# Patient Record
Sex: FEMALE | Race: BLACK OR AFRICAN AMERICAN | URBAN - METROPOLITAN AREA
[De-identification: names, ages, dates, MRNs, and addresses within clinical notes are randomized per-mention and may not be internally consistent; named-entity substitution may affect disease eponyms.]

---

## 2019-11-12 ENCOUNTER — HOSPITAL ENCOUNTER (OUTPATIENT)
Facility: CLINIC | Age: 64
End: 2019-11-12
Attending: UROLOGY | Admitting: UROLOGY
Payer: COMMERCIAL

## 2019-11-15 RX ORDER — CEFAZOLIN SODIUM 2 G/100ML
2 INJECTION, SOLUTION INTRAVENOUS
Status: CANCELLED | OUTPATIENT
Start: 2019-11-21

## 2022-10-16 ENCOUNTER — LAB REQUISITION (OUTPATIENT)
Dept: LAB | Facility: CLINIC | Age: 67
End: 2022-10-16

## 2022-10-16 VITALS
RESPIRATION RATE: 18 BRPM | WEIGHT: 153.6 LBS | DIASTOLIC BLOOD PRESSURE: 75 MMHG | OXYGEN SATURATION: 100 % | HEART RATE: 69 BPM | TEMPERATURE: 97.6 F | SYSTOLIC BLOOD PRESSURE: 148 MMHG

## 2022-10-16 DIAGNOSIS — Z00.01 ENCOUNTER FOR GENERAL ADULT MEDICAL EXAMINATION WITH ABNORMAL FINDINGS: ICD-10-CM

## 2022-10-16 PROBLEM — M79.604 CHRONIC PAIN OF RIGHT LOWER EXTREMITY: Status: ACTIVE | Noted: 2019-11-15

## 2022-10-16 PROBLEM — C67.9 BLADDER CANCER (H): Status: ACTIVE | Noted: 2019-11-15

## 2022-10-16 PROBLEM — F41.8 DEPRESSION WITH ANXIETY: Status: ACTIVE | Noted: 2019-11-15

## 2022-10-16 PROBLEM — H40.9 GLAUCOMA: Status: ACTIVE | Noted: 2019-12-11

## 2022-10-16 PROBLEM — M79.2 NEUROPATHIC PAIN: Status: ACTIVE | Noted: 2019-12-11

## 2022-10-16 PROBLEM — G47.00 INSOMNIA: Status: ACTIVE | Noted: 2019-12-11

## 2022-10-16 PROBLEM — G89.29 CHRONIC PAIN OF RIGHT LOWER EXTREMITY: Status: ACTIVE | Noted: 2019-11-15

## 2022-10-16 RX ORDER — GABAPENTIN 300 MG/1
300 CAPSULE ORAL AT BEDTIME
COMMUNITY
End: 2022-10-19

## 2022-10-16 RX ORDER — LATANOPROSTENE BUNOD 0.24 MG/ML
1 SOLUTION/ DROPS OPHTHALMIC AT BEDTIME
COMMUNITY

## 2022-10-16 RX ORDER — FAMOTIDINE 20 MG/1
20 TABLET, FILM COATED ORAL 2 TIMES DAILY
COMMUNITY

## 2022-10-16 RX ORDER — OXYCODONE HYDROCHLORIDE 5 MG/1
5 TABLET ORAL EVERY 4 HOURS PRN
COMMUNITY
End: 2022-10-19

## 2022-10-16 RX ORDER — SENNOSIDES 8.6 MG
1 TABLET ORAL DAILY PRN
COMMUNITY

## 2022-10-16 RX ORDER — CALCIUM CARBONATE 300MG(750)
400 TABLET,CHEWABLE ORAL DAILY
COMMUNITY

## 2022-10-16 RX ORDER — FLUTICASONE PROPIONATE 50 MCG
2 SPRAY, SUSPENSION (ML) NASAL DAILY
COMMUNITY

## 2022-10-16 RX ORDER — ACETAMINOPHEN 325 MG/1
650 TABLET ORAL EVERY 6 HOURS PRN
COMMUNITY
End: 2022-10-17

## 2022-10-16 RX ORDER — TIMOLOL MALEATE 5 MG/ML
1 SOLUTION/ DROPS OPHTHALMIC 2 TIMES DAILY
COMMUNITY

## 2022-10-16 RX ORDER — MULTIVIT-MIN/IRON/FOLIC ACID/K 18-600-40
500 CAPSULE ORAL DAILY
COMMUNITY

## 2022-10-16 RX ORDER — CHOLECALCIFEROL (VITAMIN D3) 50 MCG
1 TABLET ORAL DAILY
COMMUNITY

## 2022-10-16 RX ORDER — GABAPENTIN 100 MG/1
100 CAPSULE ORAL DAILY
COMMUNITY
End: 2022-10-19

## 2022-10-16 RX ORDER — NYSTATIN 100000 [USP'U]/G
1 POWDER TOPICAL 2 TIMES DAILY PRN
COMMUNITY

## 2022-10-17 ENCOUNTER — TRANSITIONAL CARE UNIT VISIT (OUTPATIENT)
Dept: GERIATRICS | Facility: CLINIC | Age: 67
End: 2022-10-17
Payer: COMMERCIAL

## 2022-10-17 ENCOUNTER — LAB REQUISITION (OUTPATIENT)
Dept: LAB | Facility: CLINIC | Age: 67
End: 2022-10-17

## 2022-10-17 DIAGNOSIS — K59.03 DRUG-INDUCED CONSTIPATION: ICD-10-CM

## 2022-10-17 DIAGNOSIS — F41.8 DEPRESSION WITH ANXIETY: ICD-10-CM

## 2022-10-17 DIAGNOSIS — S52.501D CLOSED FRACTURE OF DISTAL END OF RIGHT RADIUS WITH ROUTINE HEALING, UNSPECIFIED FRACTURE MORPHOLOGY, SUBSEQUENT ENCOUNTER: ICD-10-CM

## 2022-10-17 DIAGNOSIS — M79.2 NEUROPATHIC PAIN: Primary | ICD-10-CM

## 2022-10-17 DIAGNOSIS — K21.00 GASTROESOPHAGEAL REFLUX DISEASE WITH ESOPHAGITIS WITHOUT HEMORRHAGE: ICD-10-CM

## 2022-10-17 DIAGNOSIS — I10 ESSENTIAL (PRIMARY) HYPERTENSION: ICD-10-CM

## 2022-10-17 DIAGNOSIS — F51.04 PSYCHOPHYSIOLOGICAL INSOMNIA: ICD-10-CM

## 2022-10-17 DIAGNOSIS — R52 PAIN: ICD-10-CM

## 2022-10-17 DIAGNOSIS — M79.604 CHRONIC PAIN OF RIGHT LOWER EXTREMITY: Primary | ICD-10-CM

## 2022-10-17 DIAGNOSIS — C67.9 MALIGNANT NEOPLASM OF URINARY BLADDER, UNSPECIFIED SITE (H): ICD-10-CM

## 2022-10-17 DIAGNOSIS — G89.29 CHRONIC PAIN OF RIGHT LOWER EXTREMITY: Primary | ICD-10-CM

## 2022-10-17 PROCEDURE — 99310 SBSQ NF CARE HIGH MDM 45: CPT | Performed by: NURSE PRACTITIONER

## 2022-10-17 PROCEDURE — 36415 COLL VENOUS BLD VENIPUNCTURE: CPT | Performed by: NURSE PRACTITIONER

## 2022-10-17 PROCEDURE — P9604 ONE-WAY ALLOW PRORATED TRIP: HCPCS | Performed by: NURSE PRACTITIONER

## 2022-10-17 PROCEDURE — 86481 TB AG RESPONSE T-CELL SUSP: CPT | Performed by: NURSE PRACTITIONER

## 2022-10-17 RX ORDER — ACETAMINOPHEN 500 MG
1000 TABLET ORAL 3 TIMES DAILY
COMMUNITY

## 2022-10-17 RX ORDER — LIDOCAINE 4 G/G
1 PATCH TOPICAL EVERY 24 HOURS
COMMUNITY

## 2022-10-17 RX ORDER — HYDROXYZINE PAMOATE 25 MG/1
25 CAPSULE ORAL 3 TIMES DAILY
COMMUNITY
End: 2022-10-19

## 2022-10-17 RX ORDER — OXYCODONE HYDROCHLORIDE 5 MG/1
5-10 TABLET ORAL EVERY 4 HOURS PRN
Qty: 30 TABLET | Refills: 0 | OUTPATIENT
Start: 2022-10-17

## 2022-10-17 NOTE — PROGRESS NOTES
[facility] GERIATRICS    PRIMARY CARE PROVIDER AND CLINIC:  Physician No Ref-Primary, No address on file  Chief Complaint   Patient presents with     Hospital F/U      [place] Medical Record Number:  [id number]  Place of Service where encounter took place:  [facility] ([facility]) [[id number]]    Francheska Robles  is a 67 year old  (1955), admitted to the above facility from [facility] on 10/14/22..   HPI:    This is a 68 yo female with PMHx of bladder cancer status post urostomy in 2019, breast cancer in 2001, right hip replacement 2/25/2020, insomnia secondary to anxiety, severe depression who underwent ORIF for right distal radial fracture status post mechanical fall in her backyard.  No issues postoperatively.  Given oxycodone and Tylenol for pain control, nonweightbearing to right upper extremity.  No other changes noted, discharged to [facility] for rehab.    CODE STATUS/ADVANCE DIRECTIVES DISCUSSION:  No Order  CPR/Full code   ALLERGIES:   Allergies   Allergen Reactions     Rifampin      Other reaction(s): Hepatic Dysfunction     Venlafaxine       PAST MEDICAL HISTORY: No past medical history on file.   PAST SURGICAL HISTORY:   has no past surgical history on file.  FAMILY HISTORY: family history is not on file.  SOCIAL HISTORY:     Patient's living condition: lives alone    Post Discharge Medication Reconciliation Status:     Post Medication Reconciliation Status: Discharge medications reconciled and changed, see notes/orders      Current Outpatient Medications   Medication Sig     acetaminophen (TYLENOL) 500 MG tablet Take 1,000 mg by mouth 3 times daily     Ascorbic Acid (VITAMIN C) 500 MG CAPS Take 500 mg by mouth daily     brinzolamide-brimonidine (SIMBRINZA) 1-0.2 % ophthalmic suspension Place 1 drop into both eyes 2 times daily     calcium carbonate 600 mg-vitamin D 400 units (CALTRATE) 600-400 MG-UNIT per tablet Take 2 tablets by mouth daily     Cyanocobalamin 2000 MCG TBCR  Take 2,000 mcg by mouth 2 times daily     famotidine (PEPCID) 20 MG tablet Take 20 mg by mouth 2 times daily     fluticasone (FLONASE) 50 MCG/ACT nasal spray Spray 2 sprays into both nostrils daily     gabapentin (NEURONTIN) 100 MG capsule Take 100 mg by mouth daily     gabapentin (NEURONTIN) 300 MG capsule Take 300 mg by mouth At Bedtime     hydrOXYzine (VISTARIL) 25 MG capsule Take 25 mg by mouth 3 times daily     latanoprostene bunod (VYZULTA) 0.024 % SOLN ophthalmic solution Place 1 drop into both eyes At Bedtime     Lidocaine (LIDOCARE) 4 % Patch Place 1 patch onto the skin every 24 hours To prevent lidocaine toxicity, patient should be patch free for 12 hrs daily. Apply to R shoulder     Magnesium 400 MG TABS Take 400 mg by mouth daily     nystatin (MYCOSTATIN) 039973 UNIT/GM external powder Apply 1 g topically 2 times daily as needed     oxyCODONE (ROXICODONE) 5 MG tablet Take 5 mg by mouth every 4 hours as needed for severe pain     polyethylene glycol-propylene glycol (SYSTANE ULTRA) 0.4-0.3 % SOLN ophthalmic solution Place 2 drops into both eyes daily     sennosides (SENOKOT) 8.6 MG tablet Take 1 tablet by mouth daily as needed for constipation     timolol maleate (TIMOPTIC) 0.5 % ophthalmic solution Place 1 drop into both eyes 2 times daily     vitamin D3 (CHOLECALCIFEROL) 50 mcg (2000 units) tablet Take 1 tablet by mouth daily     No current facility-administered medications for this visit.       ROS:  10 point ROS of systems including Constitutional, Eyes, Respiratory, Cardiovascular, Gastroenterology, Genitourinary, Integumentary, Musculoskeletal, Psychiatric were all negative except for pertinent positives noted in my HPI.    Vitals:  BP (!) 148/75   Pulse 69   Temp 97.6  F (36.4  C)   Resp 18   Wt 69.7 kg (153 lb 9.6 oz)   SpO2 100%   Exam:  GENERAL APPEARANCE:  Alert, oriented, cooperative, R UE pain  ENT:  Mouth and posterior oropharynx normal, moist mucous membranes, normal hearing  acuity  NECK:  No adenopathy,masses or thyromegaly  RESP:  lungs clear to auscultation , no respiratory distress  CV:  regular rate and rhythm, no murmur, rub, or gallop, no edema  ABDOMEN:  normal bowel sounds, soft, nontender, no hepatosplenomegaly or other masses, Urostomy patent  M/S:   Nonweightbearing RUE  SKIN:  Right extremity wrapped  NEURO:   No focal deficits  PSYCH:  oriented X 3. SLUMS 28/30    Lab/Diagnostic data:  Most Recent 3 CBC's:No lab results found.  Most Recent 3 BMP's:No lab results found.    ASSESSMENT/PLAN:    (S52.987G) Closed fracture of distal end of right radius with routine healing, unspecified fracture morphology  History of right HEMA  No weightbearing in RU ED, okay for gentle movement of fingers and elevate extremity with no lifting, pushing and pulling.  Follow-up with Ortho (TBD).  Continue calcium/vit D supplementation    (R52) Pain  Increase Tylenol to 1000 mg 3 times daily, start Vistaril 25 mg 3 times daily, decrease oxycodone to 5 mg of 4 hours as needed, encourage icing    (M79.2) Neuropathic pain   Continue gabapentin 100 mg in a.m. and 300 mg at bedtime    (C67.9) Malignant neoplasm of urinary bladder, unspecified site (H), status post urostomy  Patent, normally able to appropriately provide own cares    Renal function  Recheck BMP on 10/18    History of anemia  Recheck CBC on 10/18    (F41.8) Depression with anxiety  Not on medication    (F51.04) Psychophysiological insomnia  Denies need for sleep aid    (K59.03) Drug-induced constipation  Continue senna 1 tab daily as needed    (K21.00) Gastroesophageal reflux disease with esophagitis without hemorrhage  Continue famotidine 20 mg twice daily    Orders:  Increase blood pressure monitoring, increase Tylenol, start Vistaril, lidocaine patch, decrease oxycodone, icing, lab recheck    Total time spent with patient visit at the skilled nursing facility was 35 min including patient visit and review of past records. Greater than  50% of total time spent with counseling and coordinating care due to increased blood pressure monitoring, increase Tylenol with Vistaril lidocaine patch and a low-dose oxycodone for pain control, BMP/CBC recheck per renal function and history of anemia with therapy which lasted 18 minutes.     Electronically signed by:  Gabino Son NP

## 2022-10-18 LAB
ANION GAP SERPL CALCULATED.3IONS-SCNC: 6 MMOL/L (ref 3–14)
BUN SERPL-MCNC: 14 MG/DL (ref 7–30)
CALCIUM SERPL-MCNC: 9.1 MG/DL (ref 8.5–10.1)
CHLORIDE BLD-SCNC: 107 MMOL/L (ref 94–109)
CO2 SERPL-SCNC: 25 MMOL/L (ref 20–32)
CREAT SERPL-MCNC: 0.55 MG/DL (ref 0.52–1.04)
ERYTHROCYTE [DISTWIDTH] IN BLOOD BY AUTOMATED COUNT: 12.9 % (ref 10–15)
GAMMA INTERFERON BACKGROUND BLD IA-ACNC: 0.04 IU/ML
GFR SERPL CREATININE-BSD FRML MDRD: >90 ML/MIN/1.73M2
GLUCOSE BLD-MCNC: 76 MG/DL (ref 70–99)
HCT VFR BLD AUTO: 33.5 % (ref 35–47)
HGB BLD-MCNC: 11.2 G/DL (ref 11.7–15.7)
M TB IFN-G BLD-IMP: POSITIVE
M TB IFN-G CD4+ BCKGRND COR BLD-ACNC: 9.96 IU/ML
MCH RBC QN AUTO: 31.1 PG (ref 26.5–33)
MCHC RBC AUTO-ENTMCNC: 33.4 G/DL (ref 31.5–36.5)
MCV RBC AUTO: 93 FL (ref 78–100)
MITOGEN IGNF BCKGRD COR BLD-ACNC: 0.2 IU/ML
MITOGEN IGNF BCKGRD COR BLD-ACNC: 0.68 IU/ML
PLATELET # BLD AUTO: 344 10E3/UL (ref 150–450)
POTASSIUM BLD-SCNC: 3.8 MMOL/L (ref 3.4–5.3)
QUANTIFERON MITOGEN: 10 IU/ML
QUANTIFERON NIL TUBE: 0.04 IU/ML
QUANTIFERON TB1 TUBE: 0.72 IU/ML
QUANTIFERON TB2 TUBE: 0.24
RBC # BLD AUTO: 3.6 10E6/UL (ref 3.8–5.2)
SODIUM SERPL-SCNC: 138 MMOL/L (ref 133–144)
WBC # BLD AUTO: 5.6 10E3/UL (ref 4–11)

## 2022-10-18 PROCEDURE — 80048 BASIC METABOLIC PNL TOTAL CA: CPT | Performed by: NURSE PRACTITIONER

## 2022-10-18 PROCEDURE — 85027 COMPLETE CBC AUTOMATED: CPT | Performed by: NURSE PRACTITIONER

## 2022-10-18 PROCEDURE — 36415 COLL VENOUS BLD VENIPUNCTURE: CPT | Performed by: NURSE PRACTITIONER

## 2022-10-19 ENCOUNTER — DISCHARGE SUMMARY NURSING HOME (OUTPATIENT)
Dept: GERIATRICS | Facility: CLINIC | Age: 67
End: 2022-10-19
Payer: COMMERCIAL

## 2022-10-19 VITALS
WEIGHT: 154.8 LBS | OXYGEN SATURATION: 99 % | HEIGHT: 68 IN | SYSTOLIC BLOOD PRESSURE: 146 MMHG | RESPIRATION RATE: 18 BRPM | DIASTOLIC BLOOD PRESSURE: 74 MMHG | BODY MASS INDEX: 23.46 KG/M2 | HEART RATE: 77 BPM | TEMPERATURE: 97.9 F

## 2022-10-19 DIAGNOSIS — M79.2 NEUROPATHIC PAIN: Primary | ICD-10-CM

## 2022-10-19 DIAGNOSIS — K59.03 DRUG-INDUCED CONSTIPATION: ICD-10-CM

## 2022-10-19 DIAGNOSIS — S52.501D CLOSED FRACTURE OF DISTAL END OF RIGHT RADIUS WITH ROUTINE HEALING, UNSPECIFIED FRACTURE MORPHOLOGY, SUBSEQUENT ENCOUNTER: ICD-10-CM

## 2022-10-19 DIAGNOSIS — R52 PAIN: ICD-10-CM

## 2022-10-19 PROCEDURE — 99316 NF DSCHRG MGMT 30 MIN+: CPT | Performed by: NURSE PRACTITIONER

## 2022-10-19 RX ORDER — GABAPENTIN 100 MG/1
100 CAPSULE ORAL DAILY
Qty: 30 CAPSULE | Refills: 0 | Status: SHIPPED | OUTPATIENT
Start: 2022-10-19

## 2022-10-19 RX ORDER — HYDROXYZINE PAMOATE 25 MG/1
25 CAPSULE ORAL 3 TIMES DAILY
Qty: 60 CAPSULE | Refills: 0 | Status: SHIPPED | OUTPATIENT
Start: 2022-10-19

## 2022-10-19 RX ORDER — GABAPENTIN 300 MG/1
300 CAPSULE ORAL AT BEDTIME
Qty: 30 CAPSULE | Refills: 0 | Status: SHIPPED | OUTPATIENT
Start: 2022-10-19

## 2022-10-19 RX ORDER — OXYCODONE HYDROCHLORIDE 5 MG/1
5 TABLET ORAL EVERY 4 HOURS PRN
Qty: 10 TABLET | Refills: 0 | Status: SHIPPED | OUTPATIENT
Start: 2022-10-19

## 2022-10-19 NOTE — PROGRESS NOTES
[facility] GERIATRICS DISCHARGE SUMMARY  PATIENT'S NAME: Francheska Robles  YOB: 1955  MEDICAL RECORD NUMBER:  [id number]  Place of Service where encounter took place:  [facility] (TCU) [[id number]]    PRIMARY CARE PROVIDER AND CLINIC RESPONSIBLE AFTER TRANSFER:   Physician No Ref-Primary, No address on file    Non-FMG Provider     Transferring providers: Gabino Son NP, MD Patricia  Recent Hospitalization/ED:  [facility] on 10/14/22.  Date of SNF Admission: October 14, 2022  Date of SNF (anticipated) Discharge: September 21, 2022  Discharged to: previous independent home  Cognitive Scores: SLUMS: 28/30  Physical Function: independent, no AD  DME: No DME needed    CODE STATUS/ADVANCE DIRECTIVES DISCUSSION:  No Order   ALLERGIES: Rifampin and Venlafaxine    HPI  This is a 68 yo female with PMHx of bladder cancer status post urostomy in 2019, breast cancer in 2001, right hip replacement 2/25/2020, insomnia secondary to anxiety, severe depression who underwent ORIF for right distal radial fracture status post mechanical fall in her backyard.  No issues postoperatively.  Given oxycodone and Tylenol for pain control, nonweightbearing to right upper extremity.  No other changes noted, discharged to [facility] for rehab.    Summary of nursing facility stay:     (S52.501D) Closed fracture of distal end of right radius with routine healing, unspecified fracture morphology  History of right HEMA  Wearing brace, ok for AAROM, gentle movement of fingers and elevate extremity with no lifting, pushing and pulling.  Follow-up with Ortho (TBD).  Continue calcium/vit D supplementation     (R52) Pain  Using Tylenol 1000 mg 3 times daily, Vistaril 25 mg 3 times daily, and oxycodone 5 mg every 4 hours as needed (2-4x/day), encourage icing     (M79.2) Neuropathic pain   Continue gabapentin 100 mg in a.m. and 300 mg at bedtime     (C67.9) Malignant neoplasm of urinary bladder,  unspecified site (H), status post urostomy  Patent, normally able to appropriately provide own cares. Using night bag     Renal function  Last Cr 0.55 with GFR >70 on 10/18     History of anemia  Last Hgb 11.2 on 10/18     (F41.8) Depression with anxiety  Not on medication     (F51.04) Psychophysiological insomnia  Denies need for sleep aid     (K59.03) Drug-induced constipation  Continue senna 1 tab daily as needed     (K21.00) Gastroesophageal reflux disease with esophagitis without hemorrhage  Continue famotidine 20 mg twice daily    Discharge Medications:  {  Current Outpatient Medications   Medication Sig Dispense Refill     acetaminophen (TYLENOL) 500 MG tablet Take 1,000 mg by mouth 3 times daily       Ascorbic Acid (VITAMIN C) 500 MG CAPS Take 500 mg by mouth daily       brinzolamide-brimonidine (SIMBRINZA) 1-0.2 % ophthalmic suspension Place 1 drop into both eyes 2 times daily       calcium carbonate 600 mg-vitamin D 400 units (CALTRATE) 600-400 MG-UNIT per tablet Take 2 tablets by mouth daily       Cyanocobalamin 2000 MCG TBCR Take 2,000 mcg by mouth 2 times daily       famotidine (PEPCID) 20 MG tablet Take 20 mg by mouth 2 times daily       fluticasone (FLONASE) 50 MCG/ACT nasal spray Spray 2 sprays into both nostrils daily       gabapentin (NEURONTIN) 100 MG capsule Take 100 mg by mouth daily       gabapentin (NEURONTIN) 300 MG capsule Take 300 mg by mouth At Bedtime       hydrOXYzine (VISTARIL) 25 MG capsule Take 25 mg by mouth 3 times daily       latanoprostene bunod (VYZULTA) 0.024 % SOLN ophthalmic solution Place 1 drop into both eyes At Bedtime       Lidocaine (LIDOCARE) 4 % Patch Place 1 patch onto the skin every 24 hours To prevent lidocaine toxicity, patient should be patch free for 12 hrs daily. Apply to R shoulder       Magnesium 400 MG TABS Take 400 mg by mouth daily       nystatin (MYCOSTATIN) 654110 UNIT/GM external powder Apply 1 g topically 2 times daily as needed       oxyCODONE  "(ROXICODONE) 5 MG tablet Take 5 mg by mouth every 4 hours as needed for severe pain       polyethylene glycol-propylene glycol (SYSTANE ULTRA) 0.4-0.3 % SOLN ophthalmic solution Place 2 drops into both eyes daily       sennosides (SENOKOT) 8.6 MG tablet Take 1 tablet by mouth daily as needed for constipation       timolol maleate (TIMOPTIC) 0.5 % ophthalmic solution Place 1 drop into both eyes 2 times daily       vitamin D3 (CHOLECALCIFEROL) 50 mcg (2000 units) tablet Take 1 tablet by mouth daily         Controlled medications:   Medication oxycodone 5mg electronically prescribed to [facility] pharmacy - 10 tabs     Past Medical History: No past medical history on file.  Physical Exam:   Vitals: BP (!) 146/74   Pulse 77   Temp 97.9  F (36.6  C)   Resp 18   Ht 1.727 m (5' 8\")   Wt 70.2 kg (154 lb 12.8 oz)   SpO2 99%   BMI 23.54 kg/m    BMI: Body mass index is 23.54 kg/m .  GENERAL APPEARANCE:  Alert, oriented, cooperative, R UE pain controlled  RESP:  lungs clear to auscultation , no respiratory distress, RA  CV:  regular rate and rhythm, no murmur, rub, or gallop, no edema  ABDOMEN:  normal bowel sounds, soft, urostomy patent  M/S: wearing brace on RUE  PSYCH:  oriented X 3. SLUMS 28/30    SNF labs: see above    DISCHARGE PLAN:    Follow up labs: No labs orders/due    Medical Follow Up:      Follow up with primary care provider in 1-2 weeks    [facility] scheduled appointments:       Discharge Services: Out Patient:  physical therapy and occupational therapy    Discharge Instructions Verbalized to Patient at Discharge:     No lifting more than 2 pounds until approved by surgeon in RLE    TOTAL DISCHARGE TIME:   Greater than 30 minutes  Electronically signed by:  Gabino Son NP                 "

## 2022-10-24 NOTE — PROGRESS NOTES
[facility]  INITIAL VISIT NOTE  October 25, 2022    PRIMARY CARE PROVIDER AND CLINIC:  No Ref-Primary, Physician No address on file    CHIEF COMPLAINT:  Hospital follow-up/Initial visit    HPI:    Francheska Robles is a 67 year old  (1955) female who was seen at [place] [facility] on October 25, 2022 for an initial visit.     Medical history is notable for breast cancer, bladder cancer, GERD, depression, anxiety, neuropathic pain, glaucoma, insomnia, and osteoarthritis.    Summary of hospital course:  Patient underwent ORIF for right distal radius fracture on October 14, 2022 status post mechanical fall in her backyard.  Surgery was performed in [facility] ([facility] orthopedics in [place])  She was given oxycodone and acetaminophen for pain control. She was then admitted to this facility for medical management, nursing care, and rehab.     Of note, history was obtained from patient, facility RN, and extensive review of the chart.    Today's visit:  Patient was seen in her room, while lying bed.  She appears comfortable.  She has intermittent pain in her right wrist.  She denies fever, chills, chest pain, palpitation, dyspnea, nausea, vomiting, abdominal pain, or urinary symptoms.  She had 2 bowel movements yesterday.      CODE STATUS:   CPR/Full code     PAST MEDICAL HISTORY:   Fall resulting in right distal radius fracture, s/p ORIF on October 14, 2022  Bladder cancer (high-grade), s/p cystectomy, ileal conduit, and urostomy in 2019  Right breast cancer, diagnosed in 2000, s/p lumpectomy, chemo, and radiation therapy  GERD  Depression  Anxiety  Insomnia  Neuropathic pain  Osteoarthritis  Glaucoma    PAST SURGICAL HISTORY:   Tonsillectomy  Tubal ligation  Right breast lumpectomy  Bilateral cataract extraction  Vitrectomy  Right mastectomy  Cystectomy, appendectomy, bilateral pelvic lymphadenectomy, and ileal conduit  Right distal radius fracture ORIF    FAMILY  HISTORY:   Family history is significant for head and neck cancer in her maternal grandmother and breast cancer in her paternal aunt.    SOCIAL HISTORY:  Patient smokes half a pack a day.  She drinks alcohol occasionally.    MEDICATIONS:  Current Outpatient Medications   Medication Sig Dispense Refill     acetaminophen (TYLENOL) 500 MG tablet Take 1,000 mg by mouth 3 times daily       Ascorbic Acid (VITAMIN C) 500 MG CAPS Take 500 mg by mouth daily       brinzolamide-brimonidine (SIMBRINZA) 1-0.2 % ophthalmic suspension Place 1 drop into both eyes 2 times daily       calcium carbonate 600 mg-vitamin D 400 units (CALTRATE) 600-400 MG-UNIT per tablet Take 2 tablets by mouth daily       Cyanocobalamin 2000 MCG TBCR Take 2,000 mcg by mouth 2 times daily       famotidine (PEPCID) 20 MG tablet Take 20 mg by mouth 2 times daily       fluticasone (FLONASE) 50 MCG/ACT nasal spray Spray 2 sprays into both nostrils daily       gabapentin (NEURONTIN) 100 MG capsule Take 1 capsule (100 mg) by mouth daily 30 capsule 0     gabapentin (NEURONTIN) 300 MG capsule Take 1 capsule (300 mg) by mouth At Bedtime 30 capsule 0     hydrOXYzine (VISTARIL) 25 MG capsule Take 1 capsule (25 mg) by mouth 3 times daily 60 capsule 0     latanoprostene bunod (VYZULTA) 0.024 % SOLN ophthalmic solution Place 1 drop into both eyes At Bedtime       Lidocaine (LIDOCARE) 4 % Patch Place 1 patch onto the skin every 24 hours To prevent lidocaine toxicity, patient should be patch free for 12 hrs daily. Apply to R shoulder       Magnesium 400 MG TABS Take 400 mg by mouth daily       nystatin (MYCOSTATIN) 455501 UNIT/GM external powder Apply 1 g topically 2 times daily as needed       oxyCODONE (ROXICODONE) 5 MG tablet Take 1 tablet (5 mg) by mouth every 4 hours as needed for severe pain 10 tablet 0     polyethylene glycol-propylene glycol (SYSTANE ULTRA) 0.4-0.3 % SOLN ophthalmic solution Place 2 drops into both eyes daily       sennosides (SENOKOT) 8.6 MG  "tablet Take 1 tablet by mouth daily as needed for constipation       timolol maleate (TIMOPTIC) 0.5 % ophthalmic solution Place 1 drop into both eyes 2 times daily       vitamin D3 (CHOLECALCIFEROL) 50 mcg (2000 units) tablet Take 1 tablet by mouth daily         Post Medication Reconciliation Status: medication reconcilation previously completed during another office visit       ALLERGIES:  Allergies   Allergen Reactions     Rifampin      Other reaction(s): Hepatic Dysfunction     Venlafaxine        ROS:  10 point ROS were negative other than the symptoms noted above in the HPI.    PHYSICAL EXAM:  Vital signs were reviewed in the chart.  Vital Signs: /57   Pulse 89   Temp 97.6  F (36.4  C)   Resp 18   Ht 1.727 m (5' 8\")   Wt 69.9 kg (154 lb)   SpO2 97%   BMI 23.42 kg/m    General: Comfortable and in no acute distress  HEENT: No conjunctival pallor, no scleral icterus or injection, moist oral mucosa  Cardiovascular: Normal S1, S2, RRR  Respiratory: Lungs clear to auscultation bilaterally  GI: Abdomen soft, non-tender, non-distended, +BS; urostomy noted  Extremities: Right wrist brace/splint is on, no LE edema  Neuro: CX II-XII grossly intact; ROM in all four extremities grossly intact  Psych: Alert and oriented x3; normal affect  Skin: No acute rash    LABORATORY/IMAGING DATA:  All relevant labs and imaging data in [facility] and/or Care Everywhere were personally reviewed today.    Hematology profile (October 18, 2022): White count 5.6, hemoglobin 11.2, platelet count 244,000, MCV 93    Chemistry profile (October 18, 2022): Sodium 138, potassium 2.8, BUN 14, creatinine 0.55, glucose 76, calcium 9.1    ASSESSMENT/PLAN:  Mechanical fall, subsequent encounter,  Right distal radius fracture, s/p ORIF on October 14, 2022,  Neuropathic pain,  Physical deconditioning.  Hemodynamically stable.  Intermittent pain in right wrist.  Plan:  Fall precaution  Continue pain management with scheduled acetaminophen, lidocaine " patch, gabapentin, and PRN oxycodone as ordered  Continue right wrist brace/splint  No lift, push, pull, or torque more than 2 pounds with right hand  Continue PT/OT evaluation and therapy  Follow-up with Ortho as directed    Anemia.  Mild and likely due to blood loss from orthopedic surgery.  Last Hgb level was 11.2 on October 18.  Previously she had normal hemoglobin of 12.4 in April 2022.  Plan:  Monitor hemoglobin periodically    History of bladder cancer (high-grade), s/p cystectomy, ileal conduit, and urostomy in 2019,  History of right breast cancer, diagnosed in 2000, s/p lumpectomy, chemo, and radiation therapy.  Plan:  Continue routine care of urostomy  Follow-up as outpatient    GERD.  Plan:  Continue famotidine 20 mg twice daily    Depression,  Anxiety.  Presently not on medical treatment.  Plan:  Monitor symptoms    Glaucoma.  Plan:  Continue timolol ophthalmic drop    Slow transit constipation.  Plan:  Continue the bowel regimen        Orders written by provider at facility:  None.        Disclaimer: This note may contain text created using speech-recognition software and may contain unintended word substitutions.      Electronically signed by:  Shima Gomez MD

## 2022-10-25 ENCOUNTER — TRANSITIONAL CARE UNIT VISIT (OUTPATIENT)
Dept: GERIATRICS | Facility: CLINIC | Age: 67
End: 2022-10-25
Payer: COMMERCIAL

## 2022-10-25 VITALS
WEIGHT: 154 LBS | HEIGHT: 68 IN | RESPIRATION RATE: 18 BRPM | HEART RATE: 89 BPM | TEMPERATURE: 97.6 F | OXYGEN SATURATION: 97 % | SYSTOLIC BLOOD PRESSURE: 119 MMHG | DIASTOLIC BLOOD PRESSURE: 57 MMHG | BODY MASS INDEX: 23.34 KG/M2

## 2022-10-25 DIAGNOSIS — R53.81 PHYSICAL DECONDITIONING: ICD-10-CM

## 2022-10-25 DIAGNOSIS — Z85.51 HISTORY OF BLADDER CANCER: ICD-10-CM

## 2022-10-25 DIAGNOSIS — F41.9 ANXIETY: ICD-10-CM

## 2022-10-25 DIAGNOSIS — M79.2 NEUROPATHIC PAIN: ICD-10-CM

## 2022-10-25 DIAGNOSIS — Z85.3 HISTORY OF BREAST CANCER: ICD-10-CM

## 2022-10-25 DIAGNOSIS — S52.591D OTHER CLOSED FRACTURE OF DISTAL END OF RIGHT RADIUS WITH ROUTINE HEALING, SUBSEQUENT ENCOUNTER: ICD-10-CM
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
## 2022-10-25 DIAGNOSIS — D62 ACUTE BLOOD LOSS ANEMIA: ICD-10-CM

## 2022-10-25 DIAGNOSIS — K59.01 SLOW TRANSIT CONSTIPATION: ICD-10-CM

## 2022-10-25 DIAGNOSIS — F32.A DEPRESSION, UNSPECIFIED DEPRESSION TYPE: ICD-10-CM

## 2022-10-25 DIAGNOSIS — K21.9 GASTROESOPHAGEAL REFLUX DISEASE, UNSPECIFIED WHETHER ESOPHAGITIS PRESENT: ICD-10-CM

## 2022-10-25 PROCEDURE — 99305 1ST NF CARE MODERATE MDM 35: CPT | Performed by: INTERNAL MEDICINE

## 2022-10-25 NOTE — LETTER
10/25/2022        RE: Francheska Robles  [address] [place] [address]        [facility]  INITIAL VISIT NOTE  October 25, 2022    PRIMARY CARE PROVIDER AND CLINIC:  No Ref-Primary, Physician No address on file    CHIEF COMPLAINT:  Hospital follow-up/Initial visit    HPI:    Francheska Robles is a 67 year old  (1955) female who was seen at [facility] on October 25, 2022 for an initial visit.     Medical history is notable for breast cancer, bladder cancer, GERD, depression, anxiety, neuropathic pain, glaucoma, insomnia, and osteoarthritis.    Summary of hospital course:  Patient underwent ORIF for right distal radius fracture on October 14, 2022 status post mechanical fall in her backyard.  Surgery was performed in [facility] ([facility] in [place])  She was given oxycodone and acetaminophen for pain control. She was then admitted to this facility for medical management, nursing care, and rehab.     Of note, history was obtained from patient, facility RN, and extensive review of the chart.    Today's visit:  Patient was seen in her room, while lying bed.  She appears comfortable.  She has intermittent pain in her right wrist.  She denies fever, chills, chest pain, palpitation, dyspnea, nausea, vomiting, abdominal pain, or urinary symptoms.  She had 2 bowel movements yesterday.      CODE STATUS:   CPR/Full code     PAST MEDICAL HISTORY:   Fall resulting in right distal radius fracture, s/p ORIF on October 14, 2022  Bladder cancer (high-grade), s/p cystectomy, ileal conduit, and urostomy in 2019  Right breast cancer, diagnosed in 2000, s/p lumpectomy, chemo, and radiation therapy  GERD  Depression  Anxiety  Insomnia  Neuropathic pain  Osteoarthritis  Glaucoma    PAST SURGICAL HISTORY:   Tonsillectomy  Tubal ligation  Right breast lumpectomy  Bilateral cataract extraction  Vitrectomy  Right mastectomy  Cystectomy, appendectomy, bilateral  pelvic lymphadenectomy, and ileal conduit  Right distal radius fracture ORIF    FAMILY HISTORY:   Family history is significant for head and neck cancer in her maternal grandmother and breast cancer in her paternal aunt.    SOCIAL HISTORY:  Patient smokes half a pack a day.  She drinks alcohol occasionally.    MEDICATIONS:  Current Outpatient Medications   Medication Sig Dispense Refill     acetaminophen (TYLENOL) 500 MG tablet Take 1,000 mg by mouth 3 times daily       Ascorbic Acid (VITAMIN C) 500 MG CAPS Take 500 mg by mouth daily       brinzolamide-brimonidine (SIMBRINZA) 1-0.2 % ophthalmic suspension Place 1 drop into both eyes 2 times daily       calcium carbonate 600 mg-vitamin D 400 units (CALTRATE) 600-400 MG-UNIT per tablet Take 2 tablets by mouth daily       Cyanocobalamin 2000 MCG TBCR Take 2,000 mcg by mouth 2 times daily       famotidine (PEPCID) 20 MG tablet Take 20 mg by mouth 2 times daily       fluticasone (FLONASE) 50 MCG/ACT nasal spray Spray 2 sprays into both nostrils daily       gabapentin (NEURONTIN) 100 MG capsule Take 1 capsule (100 mg) by mouth daily 30 capsule 0     gabapentin (NEURONTIN) 300 MG capsule Take 1 capsule (300 mg) by mouth At Bedtime 30 capsule 0     hydrOXYzine (VISTARIL) 25 MG capsule Take 1 capsule (25 mg) by mouth 3 times daily 60 capsule 0     latanoprostene bunod (VYZULTA) 0.024 % SOLN ophthalmic solution Place 1 drop into both eyes At Bedtime       Lidocaine (LIDOCARE) 4 % Patch Place 1 patch onto the skin every 24 hours To prevent lidocaine toxicity, patient should be patch free for 12 hrs daily. Apply to R shoulder       Magnesium 400 MG TABS Take 400 mg by mouth daily       nystatin (MYCOSTATIN) 473175 UNIT/GM external powder Apply 1 g topically 2 times daily as needed       oxyCODONE (ROXICODONE) 5 MG tablet Take 1 tablet (5 mg) by mouth every 4 hours as needed for severe pain 10 tablet 0     polyethylene glycol-propylene glycol (SYSTANE ULTRA) 0.4-0.3 % SOLN  "ophthalmic solution Place 2 drops into both eyes daily       sennosides (SENOKOT) 8.6 MG tablet Take 1 tablet by mouth daily as needed for constipation       timolol maleate (TIMOPTIC) 0.5 % ophthalmic solution Place 1 drop into both eyes 2 times daily       vitamin D3 (CHOLECALCIFEROL) 50 mcg (2000 units) tablet Take 1 tablet by mouth daily         Post Medication Reconciliation Status: medication reconcilation previously completed during another office visit       ALLERGIES:  Allergies   Allergen Reactions     Rifampin      Other reaction(s): Hepatic Dysfunction     Venlafaxine        ROS:  10 point ROS were negative other than the symptoms noted above in the HPI.    PHYSICAL EXAM:  Vital signs were reviewed in the chart.  Vital Signs: /57   Pulse 89   Temp 97.6  F (36.4  C)   Resp 18   Ht 1.727 m (5' 8\")   Wt 69.9 kg (154 lb)   SpO2 97%   BMI 23.42 kg/m    General: Comfortable and in no acute distress  HEENT: No conjunctival pallor, no scleral icterus or injection, moist oral mucosa  Cardiovascular: Normal S1, S2, RRR  Respiratory: Lungs clear to auscultation bilaterally  GI: Abdomen soft, non-tender, non-distended, +BS; urostomy noted  Extremities: Right wrist brace/splint is on, no LE edema  Neuro: CX II-XII grossly intact; ROM in all four extremities grossly intact  Psych: Alert and oriented x3; normal affect  Skin: No acute rash    LABORATORY/IMAGING DATA:  All relevant labs and imaging data in [facility] and/or Care Everywhere were personally reviewed today.    Hematology profile (October 18, 2022): White count 5.6, hemoglobin 11.2, platelet count 244,000, MCV 93    Chemistry profile (October 18, 2022): Sodium 138, potassium 2.8, BUN 14, creatinine 0.55, glucose 76, calcium 9.1    ASSESSMENT/PLAN:  Mechanical fall, subsequent encounter,  Right distal radius fracture, s/p ORIF on October 14, 2022,  Neuropathic pain,  Physical deconditioning.  Hemodynamically stable.  Intermittent pain in right " wrist.  Plan:  Fall precaution  Continue pain management with scheduled acetaminophen, lidocaine patch, gabapentin, and PRN oxycodone as ordered  Continue right wrist brace/splint  No lift, push, pull, or torque more than 2 pounds with right hand  Continue PT/OT evaluation and therapy  Follow-up with Ortho as directed    Anemia.  Mild and likely due to blood loss from orthopedic surgery.  Last Hgb level was 11.2 on October 18.  Previously she had normal hemoglobin of 12.4 in April 2022.  Plan:  Monitor hemoglobin periodically    History of bladder cancer (high-grade), s/p cystectomy, ileal conduit, and urostomy in 2019,  History of right breast cancer, diagnosed in 2000, s/p lumpectomy, chemo, and radiation therapy.  Plan:  Continue routine care of urostomy  Follow-up as outpatient    GERD.  Plan:  Continue famotidine 20 mg twice daily    Depression,  Anxiety.  Presently not on medical treatment.  Plan:  Monitor symptoms    Glaucoma.  Plan:  Continue timolol ophthalmic drop    Slow transit constipation.  Plan:  Continue the bowel regimen        Orders written by provider at facility:  None.        Disclaimer: This note may contain text created using speech-recognition software and may contain unintended word substitutions.      Electronically signed by:  Shima Gomez MD                          Sincerely,        Shima Gomez MD